# Patient Record
Sex: FEMALE | Race: WHITE | HISPANIC OR LATINO | Employment: UNEMPLOYED | URBAN - METROPOLITAN AREA
[De-identification: names, ages, dates, MRNs, and addresses within clinical notes are randomized per-mention and may not be internally consistent; named-entity substitution may affect disease eponyms.]

---

## 2022-04-27 ENCOUNTER — ANESTHESIA (OUTPATIENT)
Dept: OBSTETRICS AND GYNECOLOGY | Facility: HOSPITAL | Age: 32
End: 2022-04-27
Payer: MEDICAID

## 2022-04-27 ENCOUNTER — HOSPITAL ENCOUNTER (INPATIENT)
Facility: HOSPITAL | Age: 32
LOS: 3 days | Discharge: HOME | End: 2022-04-30
Attending: SPECIALIST | Admitting: SPECIALIST
Payer: MEDICAID

## 2022-04-27 ENCOUNTER — ANESTHESIA EVENT (OUTPATIENT)
Dept: OBSTETRICS AND GYNECOLOGY | Facility: HOSPITAL | Age: 32
End: 2022-04-27
Payer: MEDICAID

## 2022-04-27 DIAGNOSIS — Z98.891 H/O CESAREAN SECTION: ICD-10-CM

## 2022-04-27 LAB
ABO + RH BLD: NORMAL
AMPHET+METHAMPHET UR QL: NEGATIVE
BARBITURATES UR QL SCN>200 NG/ML: NEGATIVE
BASOPHILS # BLD AUTO: 0.02 K/UL (ref 0–0.2)
BASOPHILS # BLD AUTO: 0.04 K/UL (ref 0–0.2)
BASOPHILS NFR BLD: 0.1 % (ref 0–1.9)
BASOPHILS NFR BLD: 0.3 % (ref 0–1.9)
BENZODIAZ UR QL SCN>200 NG/ML: NEGATIVE
BLD GP AB SCN CELLS X3 SERPL QL: NORMAL
BUPRENORPHINE UR QL: NEGATIVE
BZE UR QL SCN: NEGATIVE
CANNABINOIDS UR QL SCN: NEGATIVE
CREAT UR-MCNC: 236 MG/DL (ref 15–325)
DIFFERENTIAL METHOD: ABNORMAL
DIFFERENTIAL METHOD: ABNORMAL
EOSINOPHIL # BLD AUTO: 0 K/UL (ref 0–0.5)
EOSINOPHIL # BLD AUTO: 0 K/UL (ref 0–0.5)
EOSINOPHIL NFR BLD: 0 % (ref 0–8)
EOSINOPHIL NFR BLD: 0.1 % (ref 0–8)
ERYTHROCYTE [DISTWIDTH] IN BLOOD BY AUTOMATED COUNT: 14 % (ref 11.5–14.5)
ERYTHROCYTE [DISTWIDTH] IN BLOOD BY AUTOMATED COUNT: 14 % (ref 11.5–14.5)
HCT VFR BLD AUTO: 34.7 % (ref 37–48.5)
HCT VFR BLD AUTO: 40.6 % (ref 37–48.5)
HGB BLD-MCNC: 11.3 G/DL (ref 12–16)
HGB BLD-MCNC: 13.7 G/DL (ref 12–16)
IMM GRANULOCYTES # BLD AUTO: 0.06 K/UL (ref 0–0.04)
IMM GRANULOCYTES # BLD AUTO: 0.08 K/UL (ref 0–0.04)
IMM GRANULOCYTES NFR BLD AUTO: 0.4 % (ref 0–0.5)
IMM GRANULOCYTES NFR BLD AUTO: 0.5 % (ref 0–0.5)
LYMPHOCYTES # BLD AUTO: 1.2 K/UL (ref 1–4.8)
LYMPHOCYTES # BLD AUTO: 1.4 K/UL (ref 1–4.8)
LYMPHOCYTES NFR BLD: 10 % (ref 18–48)
LYMPHOCYTES NFR BLD: 7.2 % (ref 18–48)
MCH RBC QN AUTO: 32 PG (ref 27–31)
MCH RBC QN AUTO: 32.6 PG (ref 27–31)
MCHC RBC AUTO-ENTMCNC: 32.6 G/DL (ref 32–36)
MCHC RBC AUTO-ENTMCNC: 33.7 G/DL (ref 32–36)
MCV RBC AUTO: 97 FL (ref 82–98)
MCV RBC AUTO: 98 FL (ref 82–98)
MONOCYTES # BLD AUTO: 0.9 K/UL (ref 0.3–1)
MONOCYTES # BLD AUTO: 1.2 K/UL (ref 0.3–1)
MONOCYTES NFR BLD: 6.4 % (ref 4–15)
MONOCYTES NFR BLD: 6.8 % (ref 4–15)
NEUTROPHILS # BLD AUTO: 11.4 K/UL (ref 1.8–7.7)
NEUTROPHILS # BLD AUTO: 14.5 K/UL (ref 1.8–7.7)
NEUTROPHILS NFR BLD: 82.8 % (ref 38–73)
NEUTROPHILS NFR BLD: 85.4 % (ref 38–73)
NRBC BLD-RTO: 0 /100 WBC
NRBC BLD-RTO: 0 /100 WBC
OPIATES UR QL SCN: NEGATIVE
PCP UR QL SCN>25 NG/ML: NEGATIVE
PLATELET # BLD AUTO: 143 K/UL (ref 150–450)
PLATELET # BLD AUTO: 158 K/UL (ref 150–450)
PMV BLD AUTO: 11.8 FL (ref 9.2–12.9)
PMV BLD AUTO: 12.7 FL (ref 9.2–12.9)
RBC # BLD AUTO: 3.53 M/UL (ref 4–5.4)
RBC # BLD AUTO: 4.2 M/UL (ref 4–5.4)
RPR SER QL: NORMAL
TOXICOLOGY INFORMATION: NORMAL
WBC # BLD AUTO: 13.74 K/UL (ref 3.9–12.7)
WBC # BLD AUTO: 17 K/UL (ref 3.9–12.7)

## 2022-04-27 PROCEDURE — 37000008 HC ANESTHESIA 1ST 15 MINUTES: Performed by: SPECIALIST

## 2022-04-27 PROCEDURE — 27000221 HC OXYGEN, UP TO 24 HOURS

## 2022-04-27 PROCEDURE — 63600175 PHARM REV CODE 636 W HCPCS: Performed by: ANESTHESIOLOGY

## 2022-04-27 PROCEDURE — 36000680 HC C/S TUBAL LIGATION LEVEL I

## 2022-04-27 PROCEDURE — 12000002 HC ACUTE/MED SURGE SEMI-PRIVATE ROOM

## 2022-04-27 PROCEDURE — 71000039 HC RECOVERY, EACH ADD'L HOUR: Performed by: SPECIALIST

## 2022-04-27 PROCEDURE — 86901 BLOOD TYPING SEROLOGIC RH(D): CPT | Performed by: SPECIALIST

## 2022-04-27 PROCEDURE — 37000009 HC ANESTHESIA EA ADD 15 MINS: Performed by: SPECIALIST

## 2022-04-27 PROCEDURE — 25000003 PHARM REV CODE 250: Performed by: ANESTHESIOLOGY

## 2022-04-27 PROCEDURE — 71000033 HC RECOVERY, INTIAL HOUR: Performed by: SPECIALIST

## 2022-04-27 PROCEDURE — 99900035 HC TECH TIME PER 15 MIN (STAT)

## 2022-04-27 PROCEDURE — 25000003 PHARM REV CODE 250: Performed by: OBSTETRICS & GYNECOLOGY

## 2022-04-27 PROCEDURE — 27000284 HC CANNULA NASAL: Performed by: ANESTHESIOLOGY

## 2022-04-27 PROCEDURE — 85025 COMPLETE CBC W/AUTO DIFF WBC: CPT | Performed by: SPECIALIST

## 2022-04-27 PROCEDURE — 27100025 HC TUBING, SET FLUID WARMER: Performed by: ANESTHESIOLOGY

## 2022-04-27 PROCEDURE — 36415 COLL VENOUS BLD VENIPUNCTURE: CPT | Performed by: SPECIALIST

## 2022-04-27 PROCEDURE — 63600175 PHARM REV CODE 636 W HCPCS: Performed by: NURSE ANESTHETIST, CERTIFIED REGISTERED

## 2022-04-27 PROCEDURE — 62326 NJX INTERLAMINAR LMBR/SAC: CPT | Performed by: ANESTHESIOLOGY

## 2022-04-27 PROCEDURE — 27000670 HC SET COMBINED SPINAL AND EPIDURAL: Performed by: ANESTHESIOLOGY

## 2022-04-27 PROCEDURE — 25000003 PHARM REV CODE 250: Performed by: SPECIALIST

## 2022-04-27 PROCEDURE — 80307 DRUG TEST PRSMV CHEM ANLYZR: CPT | Performed by: SPECIALIST

## 2022-04-27 PROCEDURE — 63600175 PHARM REV CODE 636 W HCPCS: Performed by: SPECIALIST

## 2022-04-27 PROCEDURE — 86592 SYPHILIS TEST NON-TREP QUAL: CPT | Performed by: SPECIALIST

## 2022-04-27 PROCEDURE — 94761 N-INVAS EAR/PLS OXIMETRY MLT: CPT

## 2022-04-27 PROCEDURE — 27000676 HC TUBING PRIMARY PLUMSET: Performed by: ANESTHESIOLOGY

## 2022-04-27 RX ORDER — DOCUSATE SODIUM 100 MG/1
200 CAPSULE, LIQUID FILLED ORAL 2 TIMES DAILY
Status: DISCONTINUED | OUTPATIENT
Start: 2022-04-27 | End: 2022-04-30 | Stop reason: HOSPADM

## 2022-04-27 RX ORDER — OXYCODONE HYDROCHLORIDE 5 MG/1
5 TABLET ORAL EVERY 4 HOURS PRN
Status: DISCONTINUED | OUTPATIENT
Start: 2022-04-27 | End: 2022-04-29

## 2022-04-27 RX ORDER — SODIUM CHLORIDE, SODIUM LACTATE, POTASSIUM CHLORIDE, CALCIUM CHLORIDE 600; 310; 30; 20 MG/100ML; MG/100ML; MG/100ML; MG/100ML
INJECTION, SOLUTION INTRAVENOUS CONTINUOUS PRN
Status: DISCONTINUED | OUTPATIENT
Start: 2022-04-27 | End: 2022-04-27

## 2022-04-27 RX ORDER — CEFOXITIN SODIUM 2 G/50ML
2 INJECTION, SOLUTION INTRAVENOUS ONCE
Status: COMPLETED | OUTPATIENT
Start: 2022-04-27 | End: 2022-04-27

## 2022-04-27 RX ORDER — DIPHENHYDRAMINE HYDROCHLORIDE 50 MG/ML
25 INJECTION INTRAMUSCULAR; INTRAVENOUS EVERY 4 HOURS PRN
Status: DISCONTINUED | OUTPATIENT
Start: 2022-04-27 | End: 2022-04-30 | Stop reason: HOSPADM

## 2022-04-27 RX ORDER — PROCHLORPERAZINE EDISYLATE 5 MG/ML
5 INJECTION INTRAMUSCULAR; INTRAVENOUS EVERY 6 HOURS PRN
Status: DISCONTINUED | OUTPATIENT
Start: 2022-04-27 | End: 2022-04-27 | Stop reason: SDUPTHER

## 2022-04-27 RX ORDER — ACETAMINOPHEN 325 MG/1
650 TABLET ORAL EVERY 6 HOURS
Status: COMPLETED | OUTPATIENT
Start: 2022-04-27 | End: 2022-04-28

## 2022-04-27 RX ORDER — ONDANSETRON 4 MG/1
8 TABLET, ORALLY DISINTEGRATING ORAL EVERY 8 HOURS PRN
Status: DISCONTINUED | OUTPATIENT
Start: 2022-04-27 | End: 2022-04-30 | Stop reason: HOSPADM

## 2022-04-27 RX ORDER — ADHESIVE BANDAGE
15 BANDAGE TOPICAL
Status: DISCONTINUED | OUTPATIENT
Start: 2022-04-27 | End: 2022-04-30 | Stop reason: HOSPADM

## 2022-04-27 RX ORDER — OXYCODONE HYDROCHLORIDE 5 MG/1
10 TABLET ORAL EVERY 4 HOURS PRN
Status: DISCONTINUED | OUTPATIENT
Start: 2022-04-27 | End: 2022-04-29

## 2022-04-27 RX ORDER — DIPHENHYDRAMINE HCL 25 MG
25 CAPSULE ORAL EVERY 4 HOURS PRN
Status: DISCONTINUED | OUTPATIENT
Start: 2022-04-27 | End: 2022-04-30 | Stop reason: HOSPADM

## 2022-04-27 RX ORDER — ONDANSETRON 2 MG/ML
INJECTION INTRAMUSCULAR; INTRAVENOUS
Status: DISCONTINUED | OUTPATIENT
Start: 2022-04-27 | End: 2022-04-27

## 2022-04-27 RX ORDER — MISOPROSTOL 200 UG/1
800 TABLET ORAL
Status: DISCONTINUED | OUTPATIENT
Start: 2022-04-27 | End: 2022-04-27

## 2022-04-27 RX ORDER — OXYTOCIN-SODIUM CHLORIDE 0.9% IV SOLN 30 UNIT/500ML 30-0.9/5 UT/ML-%
30 SOLUTION INTRAVENOUS ONCE
Status: DISCONTINUED | OUTPATIENT
Start: 2022-04-27 | End: 2022-04-30 | Stop reason: HOSPADM

## 2022-04-27 RX ORDER — CARBOPROST TROMETHAMINE 250 UG/ML
250 INJECTION, SOLUTION INTRAMUSCULAR
Status: DISCONTINUED | OUTPATIENT
Start: 2022-04-27 | End: 2022-04-27

## 2022-04-27 RX ORDER — OXYTOCIN-SODIUM CHLORIDE 0.9% IV SOLN 30 UNIT/500ML 30-0.9/5 UT/ML-%
SOLUTION INTRAVENOUS CONTINUOUS PRN
Status: DISCONTINUED | OUTPATIENT
Start: 2022-04-27 | End: 2022-04-27

## 2022-04-27 RX ORDER — NALOXONE HCL 0.4 MG/ML
0.02 VIAL (ML) INJECTION ONCE AS NEEDED
Status: DISCONTINUED | OUTPATIENT
Start: 2022-04-27 | End: 2022-04-30 | Stop reason: HOSPADM

## 2022-04-27 RX ORDER — METHYLERGONOVINE MALEATE 0.2 MG/ML
200 INJECTION INTRAVENOUS
Status: COMPLETED | OUTPATIENT
Start: 2022-04-27 | End: 2022-04-27

## 2022-04-27 RX ORDER — ONDANSETRON 2 MG/ML
4 INJECTION INTRAMUSCULAR; INTRAVENOUS EVERY 6 HOURS PRN
Status: DISCONTINUED | OUTPATIENT
Start: 2022-04-27 | End: 2022-04-30 | Stop reason: HOSPADM

## 2022-04-27 RX ORDER — SIMETHICONE 80 MG
1 TABLET,CHEWABLE ORAL EVERY 6 HOURS PRN
Status: DISCONTINUED | OUTPATIENT
Start: 2022-04-27 | End: 2022-04-30 | Stop reason: HOSPADM

## 2022-04-27 RX ORDER — PROCHLORPERAZINE EDISYLATE 5 MG/ML
5 INJECTION INTRAMUSCULAR; INTRAVENOUS EVERY 6 HOURS PRN
Status: DISCONTINUED | OUTPATIENT
Start: 2022-04-27 | End: 2022-04-30 | Stop reason: HOSPADM

## 2022-04-27 RX ORDER — DIPHENHYDRAMINE HYDROCHLORIDE 50 MG/ML
12.5 INJECTION INTRAMUSCULAR; INTRAVENOUS
Status: DISCONTINUED | OUTPATIENT
Start: 2022-04-27 | End: 2022-04-30 | Stop reason: HOSPADM

## 2022-04-27 RX ORDER — SODIUM CHLORIDE, SODIUM LACTATE, POTASSIUM CHLORIDE, CALCIUM CHLORIDE 600; 310; 30; 20 MG/100ML; MG/100ML; MG/100ML; MG/100ML
INJECTION, SOLUTION INTRAVENOUS CONTINUOUS
Status: DISCONTINUED | OUTPATIENT
Start: 2022-04-27 | End: 2022-04-27

## 2022-04-27 RX ORDER — BUPIVACAINE HYDROCHLORIDE 7.5 MG/ML
INJECTION, SOLUTION EPIDURAL; RETROBULBAR
Status: COMPLETED | OUTPATIENT
Start: 2022-04-27 | End: 2022-04-27

## 2022-04-27 RX ORDER — ACETAMINOPHEN 10 MG/ML
INJECTION, SOLUTION INTRAVENOUS
Status: DISCONTINUED | OUTPATIENT
Start: 2022-04-27 | End: 2022-04-27

## 2022-04-27 RX ORDER — MUPIROCIN 20 MG/G
OINTMENT TOPICAL
Status: DISCONTINUED | OUTPATIENT
Start: 2022-04-27 | End: 2022-04-30 | Stop reason: HOSPADM

## 2022-04-27 RX ORDER — FENTANYL CITRATE 50 UG/ML
INJECTION, SOLUTION INTRAMUSCULAR; INTRAVENOUS
Status: DISCONTINUED | OUTPATIENT
Start: 2022-04-27 | End: 2022-04-27

## 2022-04-27 RX ORDER — MISOPROSTOL 200 UG/1
400 TABLET ORAL ONCE
Status: COMPLETED | OUTPATIENT
Start: 2022-04-27 | End: 2022-04-27

## 2022-04-27 RX ORDER — HYDROMORPHONE HCL IN 0.9% NACL 6 MG/30 ML
PATIENT CONTROLLED ANALGESIA SYRINGE INTRAVENOUS CONTINUOUS
Status: DISCONTINUED | OUTPATIENT
Start: 2022-04-27 | End: 2022-04-27

## 2022-04-27 RX ORDER — NALBUPHINE HYDROCHLORIDE 20 MG/ML
5 INJECTION, SOLUTION INTRAMUSCULAR; INTRAVENOUS; SUBCUTANEOUS EVERY 4 HOURS PRN
Status: DISCONTINUED | OUTPATIENT
Start: 2022-04-27 | End: 2022-04-27

## 2022-04-27 RX ORDER — HYDROMORPHONE HYDROCHLORIDE 1 MG/ML
1 INJECTION, SOLUTION INTRAMUSCULAR; INTRAVENOUS; SUBCUTANEOUS EVERY 4 HOURS PRN
Status: DISCONTINUED | OUTPATIENT
Start: 2022-04-27 | End: 2022-04-27

## 2022-04-27 RX ADMIN — OXYCODONE HYDROCHLORIDE 10 MG: 5 TABLET ORAL at 09:04

## 2022-04-27 RX ADMIN — SODIUM CHLORIDE, SODIUM LACTATE, POTASSIUM CHLORIDE, AND CALCIUM CHLORIDE: .6; .31; .03; .02 INJECTION, SOLUTION INTRAVENOUS at 04:04

## 2022-04-27 RX ADMIN — ACETAMINOPHEN 650 MG: 325 TABLET, FILM COATED ORAL at 11:04

## 2022-04-27 RX ADMIN — ACETAMINOPHEN 1000 MG: 10 INJECTION, SOLUTION INTRAVENOUS at 04:04

## 2022-04-27 RX ADMIN — Medication 100 ML/HR: at 04:04

## 2022-04-27 RX ADMIN — ACETAMINOPHEN 650 MG: 325 TABLET, FILM COATED ORAL at 06:04

## 2022-04-27 RX ADMIN — ONDANSETRON 4 MG: 2 INJECTION INTRAMUSCULAR; INTRAVENOUS at 10:04

## 2022-04-27 RX ADMIN — Medication 2750 ML/HR: at 04:04

## 2022-04-27 RX ADMIN — BUPIVACAINE HYDROCHLORIDE 1.4 ML: 7.5 INJECTION, SOLUTION EPIDURAL; RETROBULBAR at 04:04

## 2022-04-27 RX ADMIN — SODIUM CHLORIDE, SODIUM LACTATE, POTASSIUM CHLORIDE, AND CALCIUM CHLORIDE: .6; .31; .03; .02 INJECTION, SOLUTION INTRAVENOUS at 03:04

## 2022-04-27 RX ADMIN — OXYCODONE HYDROCHLORIDE 5 MG: 5 TABLET ORAL at 08:04

## 2022-04-27 RX ADMIN — CEFOXITIN SODIUM 2 G: 2 INJECTION, SOLUTION INTRAVENOUS at 04:04

## 2022-04-27 RX ADMIN — Medication: at 07:04

## 2022-04-27 RX ADMIN — FENTANYL CITRATE 100 MCG: 50 INJECTION INTRAMUSCULAR; INTRAVENOUS at 04:04

## 2022-04-27 RX ADMIN — MISOPROSTOL 400 MCG: 200 TABLET ORAL at 08:04

## 2022-04-27 RX ADMIN — METHYLERGONOVINE MALEATE 200 MCG: 0.2 INJECTION, SOLUTION INTRAMUSCULAR; INTRAVENOUS at 04:04

## 2022-04-27 RX ADMIN — DOCUSATE SODIUM 200 MG: 100 CAPSULE, LIQUID FILLED ORAL at 09:04

## 2022-04-27 RX ADMIN — ONDANSETRON 4 MG: 2 INJECTION INTRAMUSCULAR; INTRAVENOUS at 03:04

## 2022-04-27 NOTE — ANESTHESIA PREPROCEDURE EVALUATION
04/27/2022  Tiffany Harry is a 31 y.o., female.      Pre-op Assessment    I have reviewed the Patient Summary Reports.     I have reviewed the Nursing Notes. I have reviewed the NPO Status.   I have reviewed the Medications.     Review of Systems  Anesthesia Hx:  No problems with previous Anesthesia Denies Hx of Anesthetic complications  Neg history of prior surgery. Denies Family Hx of Anesthesia complications.   Denies Personal Hx of Anesthesia complications.   Social:  Non-Smoker, No Alcohol Use    Hematology/Oncology:  Hematology Normal   Oncology Normal     EENT/Dental:EENT/Dental Normal   Cardiovascular:  Cardiovascular Normal     Pulmonary:  Pulmonary Normal    Renal/:  Renal/ Normal     Hepatic/GI:  Hepatic/GI Normal    Musculoskeletal:  Musculoskeletal Normal    Neurological:  Neurology Normal    Endocrine:  Endocrine Normal    Dermatological:  Skin Normal    Psych:  Psychiatric Normal           Physical Exam  General: Well nourished and Alert    Airway:  Mallampati: II   Mouth Opening: Normal  TM Distance: Normal  Tongue: Normal  Neck ROM: Normal ROM    Dental:  Intact    Chest/Lungs:  Clear to auscultation, Normal Respiratory Rate    Heart:  Rate: Normal  Rhythm: Regular Rhythm  Sounds: Normal        Anesthesia Plan  Type of Anesthesia, risks & benefits discussed:    Anesthesia Type: CSE  Intra-op Monitoring Plan: Standard ASA Monitors  Post Op Pain Control Plan: multimodal analgesia  Informed Consent: Informed consent signed with the Patient and all parties understand the risks and agree with anesthesia plan.  All questions answered. Patient consented to blood products? Yes  ASA Score: 2  Anesthesia Plan Notes: Tylenol 1 gm IV      Ready For Surgery From Anesthesia Perspective.     .

## 2022-04-27 NOTE — PROGRESS NOTES
Pt seen and evaluated.   in room and helped with communication.  Pt reports full return of sensation and motor function to the lower extremities.  She denies any pain at this time.  Patient was able to move both legs and feet examination.  No apparent anesthetic issues at this time.

## 2022-04-27 NOTE — ANESTHESIA PROCEDURE NOTES
CSE    Patient location during procedure: OB  Start time: 4/27/2022 4:04 AM  Timeout: 4/27/2022 4:03 AM  End time: 4/27/2022 4:17 AM    Reason for block: at surgeon's request and post-op pain management    Staffing  Authorizing Provider: Cholo Gimenez Jr., MD  Performing Provider: Cholo Gimenez Jr., MD    Preanesthetic Checklist  Completed: patient identified, IV checked, risks and benefits discussed, surgical consent, monitors and equipment checked, pre-op evaluation and timeout performed  CSE  Patient position: sitting  Prep: Betadine  Patient monitoring: heart rate, cardiac monitor, continuous pulse ox and frequent blood pressure checks  Approach: midline  Spinal Needle  Needle type: pencil-tip   Needle gauge: 25 G  Needle length: 5 in  Epidural Needle  Injection technique: LUCERO air  Needle type: Tuohy   Needle gauge: 17 G  Needle length: 3.5 in  Location: L3-4  Needle localization: anatomical landmarks   Assessment  Sensory level: T5   Dermatomal levels determined by pinch or prick  Intrathecal Medications:   administered: primary anesthetic mcg of    Additional Notes  Attempted @ L2 - L3 with good LUCERO. Epidural vein encountered with Tuohy needle, withdrawn and procedure done at L3 - L4. Spinal needle advanced and good CSF flow. 1.4 cc of spinal Marcaine .75% injected. Attempted to thread cath but would not thread. Tuohy removed, Pt placed supine and in MALA. Pt with good spinal block.  Medications:    Medications: Intraspinal - bupivacaine (pf) (MARCAINE) injection 0.75% - Intraspinal   1.4 mL - 4/27/2022 4:15:00 AM

## 2022-04-27 NOTE — PLAN OF CARE
Problem: Adult Inpatient Plan of Care  Goal: Plan of Care Review  Outcome: Ongoing, Progressing  Goal: Patient-Specific Goal (Individualized)  Outcome: Ongoing, Progressing  Goal: Optimal Comfort and Wellbeing  Outcome: Ongoing, Progressing     Problem: Infection  Goal: Absence of Infection Signs and Symptoms  Outcome: Ongoing, Progressing     Problem: Change in Fetal Wellbeing ( Delivery)  Goal: Stable Fetal Wellbeing  Outcome: Ongoing, Progressing     Problem: Infection ( Delivery)  Goal: Absence of Infection Signs and Symptoms  Outcome: Ongoing, Progressing

## 2022-04-27 NOTE — PROGRESS NOTES
OB Screen completed and no needs identified at this time.  White board in room updated with contact information, and mother was encouraged to contact office if further needs arise.     22 1412   OB SCREEN   Assessment Type Discharge Planning Assessment   Source of Information patient   Received Prenatal Care Yes   Any indications/suspicions for None   Is this a teen pregnancy No   Is the baby in NICU No   Indication for adoption/Safe Haven No   Indication for DME/post-acute needs No   HIV (+) No   Any congenital  disorders No   Fetal demise/ death No

## 2022-04-27 NOTE — TRANSFER OF CARE
"Anesthesia Transfer of Care Note    Patient: Tiffany Harry    Procedure(s) Performed: Procedure(s) (LRB):  REPEAT  SECTION, WITH TUBAL LIGATION (N/A)    Patient location: Labor and Delivery    Anesthesia Type: spinal    Transport from OR: Transported from OR on room air with adequate spontaneous ventilation    Post pain: adequate analgesia    Post assessment: no apparent anesthetic complications    Post vital signs: stable    Level of consciousness: awake, alert and oriented    Nausea/Vomiting: no nausea/vomiting    Complications: none    Transfer of care protocol was followed      Last vitals:   Visit Vitals  /72 (BP Location: Left arm, Patient Position: Lying)   Pulse 78   Temp 35.9 °C (96.6 °F) (Tympanic)   Resp 18   Ht 5' 3" (1.6 m)   Wt 78.5 kg (173 lb)   SpO2 100%   Breastfeeding No   BMI 30.65 kg/m²     "

## 2022-04-27 NOTE — CARE UPDATE
04/27/22 1508   PRE-TX-O2   O2 Device (Oxygen Therapy) nasal cannula   $ Is the patient on Low Flow Oxygen? Yes   Flow (L/min) 2   SpO2 98 %   Pulse Oximetry Type Continuous   $ Pulse Oximetry - Multiple Charge Pulse Oximetry - Multiple   Pulse 81   Resp 15   ETCO2   $ ETCO2 Usage Currently wearing   ETCO2 (mmHg) 34 mmHg   ETCO2 Device Type Portable Bedside Monitor   Respiratory Evaluation   $ Care Plan Tech Time 15 min

## 2022-04-28 PROCEDURE — 25000003 PHARM REV CODE 250: Performed by: SPECIALIST

## 2022-04-28 PROCEDURE — 25000003 PHARM REV CODE 250: Performed by: ANESTHESIOLOGY

## 2022-04-28 PROCEDURE — 12000002 HC ACUTE/MED SURGE SEMI-PRIVATE ROOM

## 2022-04-28 RX ADMIN — IBUPROFEN 600 MG: 400 TABLET ORAL at 09:04

## 2022-04-28 RX ADMIN — OXYCODONE HYDROCHLORIDE 10 MG: 5 TABLET ORAL at 07:04

## 2022-04-28 RX ADMIN — OXYCODONE HYDROCHLORIDE 10 MG: 5 TABLET ORAL at 03:04

## 2022-04-28 RX ADMIN — OXYCODONE HYDROCHLORIDE 10 MG: 5 TABLET ORAL at 11:04

## 2022-04-28 RX ADMIN — IBUPROFEN 600 MG: 400 TABLET ORAL at 03:04

## 2022-04-28 RX ADMIN — ACETAMINOPHEN 650 MG: 325 TABLET, FILM COATED ORAL at 11:04

## 2022-04-28 RX ADMIN — DOCUSATE SODIUM 200 MG: 100 CAPSULE, LIQUID FILLED ORAL at 08:04

## 2022-04-28 RX ADMIN — DOCUSATE SODIUM 200 MG: 100 CAPSULE, LIQUID FILLED ORAL at 09:04

## 2022-04-28 RX ADMIN — ACETAMINOPHEN 650 MG: 325 TABLET, FILM COATED ORAL at 05:04

## 2022-04-28 NOTE — PLAN OF CARE
Patient remained stable this shift, VSS, fundus firm and midline, lochia small, + flatus. Pain managed with meds per MAR, PCA discontinued. Dressing C/D/I. Baugh removed, voiding without difficulty. POC discussed, all questions answered, denies needs. Will continue to monitor and report to oncoming shift.

## 2022-04-28 NOTE — ANESTHESIA POSTPROCEDURE EVALUATION
Anesthesia Post Evaluation    Patient: Tiffany Harry    Procedure(s) Performed: Procedure(s) (LRB):  REPEAT  SECTION, WITH TUBAL LIGATION (N/A)    Final Anesthesia Type: spinal      Patient location during evaluation: labor & delivery  Patient participation: Yes- Able to Participate  Level of consciousness: awake and alert  Post-procedure vital signs: reviewed and stable  Pain management: adequate  Airway patency: patent    PONV status at discharge: nausea (controlled) and vomiting (controlled)  Anesthetic complications: no      Cardiovascular status: hemodynamically stable  Respiratory status: unassisted  Hydration status: euvolemic  Follow-up not needed.          Vitals Value Taken Time   /72 22   Temp 36.9 °C (98.5 °F) 22   Pulse 79 22   Resp 18 22 0750   SpO2 97 % 22         Event Time   Out of Recovery 09:25:00         Pain/Emily Score: Pain Rating Prior to Med Admin: 6 (2022  9:37 AM)  Pain Rating Post Med Admin: 2 (2022  8:50 AM)  Emliy Score: 9 (2022  7:10 AM)

## 2022-04-29 PROBLEM — O34.219 PREVIOUS CESAREAN DELIVERY AFFECTING PREGNANCY: Status: ACTIVE | Noted: 2022-04-29

## 2022-04-29 PROCEDURE — S5010 5% DEXTROSE AND 0.45% SALINE: HCPCS | Performed by: SPECIALIST

## 2022-04-29 PROCEDURE — 63600175 PHARM REV CODE 636 W HCPCS: Performed by: SPECIALIST

## 2022-04-29 PROCEDURE — 25000003 PHARM REV CODE 250: Performed by: SPECIALIST

## 2022-04-29 PROCEDURE — 25000003 PHARM REV CODE 250: Performed by: ANESTHESIOLOGY

## 2022-04-29 PROCEDURE — 12000002 HC ACUTE/MED SURGE SEMI-PRIVATE ROOM

## 2022-04-29 RX ORDER — POLYETHYLENE GLYCOL 3350 17 G/17G
17 POWDER, FOR SOLUTION ORAL 2 TIMES DAILY
Status: DISCONTINUED | OUTPATIENT
Start: 2022-04-29 | End: 2022-04-30 | Stop reason: HOSPADM

## 2022-04-29 RX ORDER — METOCLOPRAMIDE HYDROCHLORIDE 5 MG/ML
10 INJECTION INTRAMUSCULAR; INTRAVENOUS EVERY 6 HOURS
Status: COMPLETED | OUTPATIENT
Start: 2022-04-29 | End: 2022-04-30

## 2022-04-29 RX ORDER — DEXTROSE MONOHYDRATE AND SODIUM CHLORIDE 5; .45 G/100ML; G/100ML
INJECTION, SOLUTION INTRAVENOUS CONTINUOUS
Status: DISCONTINUED | OUTPATIENT
Start: 2022-04-29 | End: 2022-04-30 | Stop reason: HOSPADM

## 2022-04-29 RX ORDER — OXYCODONE AND ACETAMINOPHEN 10; 325 MG/1; MG/1
1 TABLET ORAL EVERY 4 HOURS PRN
Status: DISCONTINUED | OUTPATIENT
Start: 2022-04-29 | End: 2022-04-30 | Stop reason: HOSPADM

## 2022-04-29 RX ORDER — OXYCODONE AND ACETAMINOPHEN 5; 325 MG/1; MG/1
1 TABLET ORAL EVERY 4 HOURS PRN
Status: DISCONTINUED | OUTPATIENT
Start: 2022-04-29 | End: 2022-04-30 | Stop reason: HOSPADM

## 2022-04-29 RX ORDER — BISACODYL 10 MG
10 SUPPOSITORY, RECTAL RECTAL EVERY 6 HOURS PRN
Status: DISCONTINUED | OUTPATIENT
Start: 2022-04-29 | End: 2022-04-30 | Stop reason: HOSPADM

## 2022-04-29 RX ADMIN — SODIUM CHLORIDE 1000 ML: 0.9 INJECTION, SOLUTION INTRAVENOUS at 12:04

## 2022-04-29 RX ADMIN — DEXTROSE AND SODIUM CHLORIDE: 5; .45 INJECTION, SOLUTION INTRAVENOUS at 01:04

## 2022-04-29 RX ADMIN — DOCUSATE SODIUM 200 MG: 100 CAPSULE, LIQUID FILLED ORAL at 08:04

## 2022-04-29 RX ADMIN — SIMETHICONE 80 MG: 80 TABLET, CHEWABLE ORAL at 04:04

## 2022-04-29 RX ADMIN — IBUPROFEN 600 MG: 400 TABLET ORAL at 06:04

## 2022-04-29 RX ADMIN — DOCUSATE SODIUM 200 MG: 100 CAPSULE, LIQUID FILLED ORAL at 10:04

## 2022-04-29 RX ADMIN — OXYCODONE HYDROCHLORIDE AND ACETAMINOPHEN 1 TABLET: 10; 325 TABLET ORAL at 10:04

## 2022-04-29 RX ADMIN — OXYCODONE AND ACETAMINOPHEN 1 TABLET: 325; 5 TABLET ORAL at 04:04

## 2022-04-29 RX ADMIN — METOCLOPRAMIDE 10 MG: 5 INJECTION, SOLUTION INTRAMUSCULAR; INTRAVENOUS at 12:04

## 2022-04-29 RX ADMIN — SIMETHICONE 80 MG: 80 TABLET, CHEWABLE ORAL at 08:04

## 2022-04-29 RX ADMIN — METOCLOPRAMIDE 10 MG: 5 INJECTION, SOLUTION INTRAMUSCULAR; INTRAVENOUS at 06:04

## 2022-04-29 RX ADMIN — OXYCODONE HYDROCHLORIDE 10 MG: 5 TABLET ORAL at 06:04

## 2022-04-29 RX ADMIN — POLYETHYLENE GLYCOL 3350 17 G: 17 POWDER, FOR SOLUTION ORAL at 12:04

## 2022-04-29 RX ADMIN — POLYETHYLENE GLYCOL 3350 17 G: 17 POWDER, FOR SOLUTION ORAL at 10:04

## 2022-04-29 NOTE — NURSING
Juliette used ID [id number]. Shift assessment completed bleeding scant to light fundus firm without massage. Patient in some mild pain. position adjusted and pillow support applied. Patient educated on being up and walking around to help reduce gas and pain. Patient verbalized understanding. Patient stated with  arrives will walk in halls. No questions or concerns at this time. Patient instructed to call for any assistance. Patient verbalized understanding.

## 2022-04-29 NOTE — NURSING
Juliette used to with MD. ID [id number]. MD educated patient on absent bowel sounds and the importance of walking the halls. Explained that iv fluids will be started. Patient verbalized understanding. Patient up walking halls with  after MD stepped out.

## 2022-04-29 NOTE — PROGRESS NOTES
"      SUBJECTIVE:     Postpartum Day 1  Delivery    Tiffany Harry states she feels well and has no complaints. She denies emotional concerns. Her pain is well controlled with current medications. The patient is not ambulating. She is tolerating a regular diet. Flatus has not been passed. Urinary output is adequate.    OBJECTIVE:     Vital Signs (Most Recent):  /79 (BP Location: Left arm, Patient Position: Sitting)   Pulse 89   Temp 98.9 °F (37.2 °C) (Oral)   Resp 17   Ht 5' 3" (1.6 m)   Wt 78.5 kg (173 lb)   SpO2 96%   Breastfeeding No   BMI 30.65 kg/m²       Vital Signs Range (Last 24H):  Reviewed    I & O (Last 24H):  Intake/Output Summary (Last 24 hours)    Intake/Output Summary (Last 24 hours) at 2022 1129  Last data filed at 2022  Gross per 24 hour   Intake --   Output 1600 ml   Net -1600 ml         Physical Exam:  Gen appears in NAD  Abd soft,appropriate tenderness hypoactive bsIncision well approximated clean dry intact  Ext  neg homans, slight edema    Hemoglobin/Hematocrit  CBC:   Lab Results   Component Value Date/Time    WBC 17.00 (H) 2022 11:28 AM    RBC 3.53 (L) 2022 11:28 AM    HGB 11.3 (L) 2022 11:28 AM    HCT 34.7 (L) 2022 11:28 AM     (L) 2022 11:28 AM    MCV 98 2022 11:28 AM    MCH 32.0 (H) 2022 11:28 AM    MCHC 32.6 2022 11:28 AM       ABO/Rh  O POS    Rubella      ASSESSMENT/PLAN:     Status post  section.   Patient Active Problem List   Diagnosis    Previous  delivery affecting pregnancy        Doing well postoperatively.     Routine advances, Continue current care.  "

## 2022-04-29 NOTE — PLAN OF CARE
Patient remained stable this shift, VSS, fundus firm and midline, lochia small, + flatus. Pain managed with meds per MAR. POC discussed, all questions answered, denies needs. Will continue to monitor and report to oncoming shift.

## 2022-04-29 NOTE — PROGRESS NOTES
"      SUBJECTIVE:     Postpartum Day 2  Delivery    Tiffany Harry states she feels discomfort in her abdomen. She denies emotional concerns. Her pain is well controlled with current medications. The patient is not ambulating much is still in the bed. She is tolerating a regular diet. Flatus has still not been passed. Urinary output is adequate.    OBJECTIVE:     Vital Signs (Most Recent):  /79 (BP Location: Left arm, Patient Position: Sitting)   Pulse 89   Temp 98.9 °F (37.2 °C) (Oral)   Resp 17   Ht 5' 3" (1.6 m)   Wt 78.5 kg (173 lb)   SpO2 96%   Breastfeeding No   BMI 30.65 kg/m²       Vital Signs Range (Last 24H):  Reviewed    I & O (Last 24H):  Intake/Output Summary (Last 24 hours)    Intake/Output Summary (Last 24 hours) at 2022 1130  Last data filed at 2022  Gross per 24 hour   Intake --   Output 1600 ml   Net -1600 ml         Physical Exam:  Gen appears in NAD  Abd soft,appropriate tenderness absent bowel sounds   Incision well approximated clean dry intact  Ext  neg homans, slight edema    Hemoglobin/Hematocrit  CBC:   Lab Results   Component Value Date/Time    WBC 17.00 (H) 2022 11:28 AM    RBC 3.53 (L) 2022 11:28 AM    HGB 11.3 (L) 2022 11:28 AM    HCT 34.7 (L) 2022 11:28 AM     (L) 2022 11:28 AM    MCV 98 2022 11:28 AM    MCH 32.0 (H) 2022 11:28 AM    MCHC 32.6 2022 11:28 AM       ABO/Rh  O POS    Rubella      ASSESSMENT/PLAN:     Status post  section.   Patient Active Problem List   Diagnosis    Previous  delivery affecting pregnancy        Status post  postop day 2 with potential mild ileus developing  Will continue encourage ambulation out of bed, will restart IV and had IV Reglan IV fluids MiraLax possible suppository as needed  "

## 2022-04-30 VITALS
TEMPERATURE: 98 F | WEIGHT: 173 LBS | DIASTOLIC BLOOD PRESSURE: 70 MMHG | SYSTOLIC BLOOD PRESSURE: 123 MMHG | HEIGHT: 63 IN | RESPIRATION RATE: 18 BRPM | HEART RATE: 74 BPM | OXYGEN SATURATION: 98 % | BODY MASS INDEX: 30.65 KG/M2

## 2022-04-30 PROCEDURE — 25000003 PHARM REV CODE 250: Performed by: SPECIALIST

## 2022-04-30 PROCEDURE — 63600175 PHARM REV CODE 636 W HCPCS: Performed by: SPECIALIST

## 2022-04-30 RX ORDER — OXYCODONE AND ACETAMINOPHEN 5; 325 MG/1; MG/1
1 TABLET ORAL EVERY 4 HOURS PRN
Qty: 28 TABLET | Refills: 0 | Status: SHIPPED | OUTPATIENT
Start: 2022-04-30

## 2022-04-30 RX ORDER — DOCUSATE SODIUM 100 MG/1
100 CAPSULE, LIQUID FILLED ORAL 2 TIMES DAILY
Qty: 60 CAPSULE | Refills: 0 | Status: SHIPPED | OUTPATIENT
Start: 2022-04-30

## 2022-04-30 RX ORDER — IBUPROFEN 600 MG/1
600 TABLET ORAL EVERY 6 HOURS PRN
Qty: 30 TABLET | Refills: 0 | Status: SHIPPED | OUTPATIENT
Start: 2022-04-30

## 2022-04-30 RX ADMIN — POLYETHYLENE GLYCOL 3350 17 G: 17 POWDER, FOR SOLUTION ORAL at 09:04

## 2022-04-30 RX ADMIN — DOCUSATE SODIUM 200 MG: 100 CAPSULE, LIQUID FILLED ORAL at 09:04

## 2022-04-30 RX ADMIN — OXYCODONE HYDROCHLORIDE AND ACETAMINOPHEN 1 TABLET: 10; 325 TABLET ORAL at 04:04

## 2022-04-30 RX ADMIN — METOCLOPRAMIDE 10 MG: 5 INJECTION, SOLUTION INTRAMUSCULAR; INTRAVENOUS at 12:04

## 2022-04-30 RX ADMIN — SIMETHICONE 80 MG: 80 TABLET, CHEWABLE ORAL at 04:04

## 2022-04-30 RX ADMIN — IBUPROFEN 600 MG: 400 TABLET ORAL at 04:04

## 2022-04-30 RX ADMIN — OXYCODONE AND ACETAMINOPHEN 1 TABLET: 325; 5 TABLET ORAL at 09:04

## 2022-04-30 RX ADMIN — METOCLOPRAMIDE 10 MG: 5 INJECTION, SOLUTION INTRAMUSCULAR; INTRAVENOUS at 05:04

## 2022-04-30 NOTE — DISCHARGE INSTRUCTIONS
Pelvic rest for 6 weeks (no sex, tampons, douching, nothing in the vagina)    You can experience vaginal bleeding on and off for up to 6 weeks, it will gradually get lighter and the color will change from bright red to a brownish discharge towards the end.    Activity:  NO strenuous activities or exercising for 6 weeks.  Do not /lift anything over 15 pounds and no heavy housework or cleaning for 6 weeks.  Limit stair climbing to twice a day during the first 2 weeks.   NO driving for 4 weeks.  You may take short car trips but do not drive.    You may shower ONLY for the first 2 weeks, after 2 weeks you can soak in a bathtub.  Use a mild soap, no heavy perfumes or fragrances to avoid irritation.     Walking frequently following a  delivery promotes healing and decreases pain associated with gas.   If constipation develops:  You may take Colace (stool softener), Milk of Magnesia, Dulcolax or Miralax.  All of these medications are sold over the counter.      Incision Care:   Clean your incision with mild soap & warm water only- do not scrub- let warm water run over it, then pat dry.      Pain Relief:  You may take Motrin for mild pain & uterine cramping.      Emotional Changes:  You may experience baby blues after delivery.  You may feel let down, anxious and cry easily.  This is normal.  These feelings can begin 2-3 days after delivery and usually disappear in about a week or two.  Prolonged sadness may indicate postpartum depression.      Call your doctor for any of the following:  Difficulty breathing, problems with any of your medications, inability to eat.    Foul smelling vaginal discharge.  If you notice pus-like drainage from your incision, if your incision or the area around it becomes hot or swollen, or if you notice a foul smelling odor.  Temperature above 100.4.    Heavy vaginal bleeding.  All women bleed different after delivery and each delivery is different.  Heavy bleeding consists of  saturating a shaji pad in a 1 hour time period.  Passing clots are normal, if you pass a blood clot larger than the size of a golf ball call your doctor's office.   If you experience pain in your legs/calves, if one leg increases in size and becomes swollen or becomes hot to touch or discolored.   Crying or periods of sadness beyond 2 weeks.        If you are not breastfeeding:  Wear a tight bra and do not stimulate your breasts.  Avoid handling your breasts and do not express milk.  You may apply ice packs or cabbage leaves to relieve discomfort from engorgement.  If your breasts become warm to touch, reddened or lumps develop call your doctor.       Hacer un seguimiento con alvarez médico en 2 semanas o antes para cualquier problema.    Willard pélvico luba 6 semanas (sin sexo, tampones, douching, nada en la vagina)   Usted puede experimentar sangrado vaginal dentro y fuera de hasta 6 semanas, poco a poco se volverá más andrew y el color cambiará de garcia brillante a iqra secreción marrón hacia el final.  Actividad:   NO hay actividades extenuantes o hacer ejercicio luba 6 semanas. No recoja/levante nada de más de 15 libras y no haya tareas domésticas pesadas o limpieza luba 6 semanas. Limite la escalada de escaleras a dos veces al día luba las primeras 2 semanas.   NO conducir luba 4 semanas. Puede hacer viajes cortos en coche, caio no conducir.   Usted puede ducharse SOLAMENTE luba las primeras 2 semanas, después de 2 semanas puede empaparse en iqra bañera. Use un jabón suave, sin perfumes pesados ni fragancias para evitar la irritación.   Caminar con frecuencia después de un parto por cesárea promueve la curación y disminuye el dolor asociado con el gas.   Si se desarrolla estreñimiento: Puede meagan Colace (ablandador de heces), Leche de Magnesia, Dulcolax o Miralax. Todos estos medicamentos se venden sin receta.   Cuidado de la incisión:]  Lynn le mostró la enfermera de cuidado de heridas, quítese el  "apósito y retire el embalaje. Entonces usted puede ducharse y alvarez otro significativo puede empacar y reparar la herida. hacer esto diariamente hasta que el Dr. Fallon le indique lo contrario.    Alivio del dolor:   Puede meagan Motrin para el dolor leve y los calambres uterinos.      Cambios emocionales:   Usted puede experimentar "baby blues" después del parto. Usted puede sentirse defraudado, ansioso y llorar fácilmente. [place] es normal. Estos sentimientos pueden comenzar 2-3 días después del parto y por lo general desaparecen en aproximadamente iqra o dos semanas. La tristeza prolongada puede indicar depresión posparto.      Llame a alvarez médico para cualquiera de los siguientes:   Dificultad para respirar, problemas con cualquiera de kenna medicamentos, incapacidad para comer.   Secreción vaginal con olor fétido.   Si notas un drenaje similar al pus de la incisión, si la incisión o el área alrededor de fernanda se calienta o se hincha, o si notas un olor fétido que huele mal.   Temperatura por encima de 100.4.   Sangrado vaginal intenso. Todas las mujeres sangran diferentes después del parto y cada parto es diferente. El sangrado intenso consiste en saturar iqra almohadilla shaji en un período de tiempo de 1 hora. Los coágulos que pasan son normales, si pasas un coágulo de sussy mayor que el tamaño de iqra pelota de golf, llama al consultorio de tu médico.   Si experimenta dolor en las piernas/terneros, si iqra pierna aumenta de tamaño y se hincha o se calienta al tacto o se decolora.   Llorar o períodos de tristeza más allá de 2 semanas.       Si no está amamantando:   Use un sostén apretado y no estimule kenna senos. Evite manipular kenna senos y no exprese leche. Usted puede aplicar compresas de hielo o hojas de repollo para aliviar las molestias del engorgement. Si los senos se calientan al tacto, se enredan o se desarrollan bultos, llame al médico.   "

## 2022-04-30 NOTE — PLAN OF CARE
Thu used for communication. Minimal lochia. Ambulating and voiding without difficulty. Passing Flatus.Pain controlled by po medication.

## 2022-04-30 NOTE — PLAN OF CARE
VSS. Fundus firm, 1 below umbilicus with scant lochia. Staples removed from LT incision, edges approximated, clean and dry. Pain controlled with pain medicines as needed. Patient has audible bowel sounds and passing gas. Patient has been ambulating in hallway and room. Patient is voiding and tolerating a regular diet. Patient is bonding with baby.

## 2022-04-30 NOTE — DISCHARGE SUMMARY
[facility]  Obstetrics  Discharge Summary      Patient Name: Tiffany Harry  MRN: [id number]  Admission Date: 2022  Hospital Length of Stay: 3 days  Discharge Date and Time:  2022 11:46 AM  Attending Physician: No att. providers found   Discharging Provider: Morena Vazquez MD, MD   Primary Care Provider: Zenon Christianson MD    Procedure(s) (LRB):  REPEAT  SECTION, WITH TUBAL LIGATION (N/A)     Hospital Course:   30yo  @ 39w0d presented for repeat CD and BTL. Underwent uncomplicated procedure and on POD3 was meeting all discharge goals. Pain well controlled, tolerating regular diet, ambulating and voiding without difficulty, passing flatus, no heavy bleeding. VSS and exam reassuring with fundus firm, appropriate abdominal tenderness, bandage c/d/i, appropriate lochia, no evidence of DVT. Precautions reviewed and will f/u with Dr. Christianson.           Final Active Diagnoses:    Diagnosis Date Noted POA    PRINCIPAL PROBLEM:  Delivery of pregnancy by  section [O82] 2022 Unknown    Previous  delivery affecting pregnancy [O34.219] 2022 Yes      Problems Resolved During this Admission:        Significant Diagnostic Studies:   Lab Results   Component Value Date/Time     WBC 17.00 (H) 2022 11:28 AM     RBC 3.53 (L) 2022 11:28 AM     HGB 11.3 (L) 2022 11:28 AM     HCT 34.7 (L) 2022 11:28 AM      (L) 2022 11:28 AM     MCV 98 2022 11:28 AM     MCH 32.0 (H) 2022 11:28 AM     MCHC 32.6 2022 11:28 AM             Lab Results   Component Value Date    GROUPTRH O POS 2022         Feeding Method: bottle    Immunizations     Date Immunization Status Dose Route/Site Given by    22 MMR Incomplete 0.5 mL Subcutaneous/     22 Tdap Incomplete 0.5 mL Intramuscular/           Delivery:    Episiotomy:     Lacerations:     Repair suture:     Repair # of packets:     Blood loss (ml):        Birth information:  YOB: 2024   Time of birth: 4:38 AM   Sex: male   Delivery type: , Low Transverse   Gestational Age: 39w0d    Delivery Clinician:      Other providers:       Additional  information:  Forceps:    Vacuum:    Breech:    Observed anomalies      Living?:           APGARS  One minute Five minutes Ten minutes   Skin color:         Heart rate:         Grimace:         Muscle tone:         Breathing:         Totals: 7  9        Placenta: Delivered:       appearance    Pending Diagnostic Studies:     None          Discharged Condition: good    Disposition: Home or Self Care    Follow Up:   Follow-up Information     Zenon Christianson MD Follow up in 1 week(s).    Specialties: Obstetrics, Obstetrics and Gynecology  Why: For wound re-check, Postpartum check  Contact information:  [address]  [facility] [address]  [phone]                       Patient Instructions:      Diet Adult Regular     Lifting restrictions   Order Comments: No more than 10lbs     Pelvic Rest     Notify your health care provider if you experience any of the following:  temperature >100.4     Notify your health care provider if you experience any of the following:  persistent nausea and vomiting or diarrhea     Notify your health care provider if you experience any of the following:  severe uncontrolled pain     Notify your health care provider if you experience any of the following:  redness, tenderness, or signs of infection (pain, swelling, redness, odor or green/yellow discharge around incision site)     Notify your health care provider if you experience any of the following:  difficulty breathing or increased cough     Notify your health care provider if you experience any of the following:  severe persistent headache     Notify your health care provider if you experience any of the following:  worsening rash     Notify your health care provider if you experience  any of the following:  persistent dizziness, light-headedness, or visual disturbances     Notify your health care provider if you experience any of the following:  increased confusion or weakness     No dressing needed     Medications:  Discharge Medication List as of 4/30/2022 12:23 PM      START taking these medications    Details   docusate sodium (COLACE) 100 MG capsule Take 1 capsule (100 mg total) by mouth 2 (two) times daily., Starting Sat 4/30/2022, Print      ibuprofen (ADVIL,MOTRIN) 600 MG tablet Take 1 tablet (600 mg total) by mouth every 6 (six) hours as needed for Pain., Starting Sat 4/30/2022, Print      oxyCODONE-acetaminophen (PERCOCET) 5-325 mg per tablet Take 1 tablet by mouth every 4 (four) hours as needed for Pain., Starting Sat 4/30/2022, Print         CONTINUE these medications which have NOT CHANGED    Details   prenatal vit/iron fum/folic ac (PRENATAL 1+1 ORAL) Take 1 tablet by mouth., Historical Med             Morena Vazquez MD, MD  Obstetrics  [facility]

## 2022-05-15 NOTE — OP NOTE
Section Operative Note with bilateral distal salpingectomy    Indications: Previous  desires elective sterilization    Pre-operative Diagnosis: [id number]    Post-operative Diagnosis: same    Surgeon: WINIFRED MARTINEZ     Assistants: tech    Anesthesia:  Spinal    Procedure Details:     After appropriate informed consent was obtained, including the risk of surgery, the patient was taken back to the operating room and placed in the dorsal supine position with leftward tilt.  She was prepped abdominally and a Baugh catheter was used to drain the bladder.  She was then draped.  She was tested for adequate anesthesia, which was excellent.  Next, a Pfannenstiel incision was made which was taken down to the fascia.  The fascia was then nicked sharply and extended laterally with the Metzenbaum scissors.  The underlying muscles were then dissected off superiorly and inferiorly sharply by elevating the fascia with the Ochsner clamps.  Next, the peritoneum was entered in the midline bluntly and extended superiorly and inferiorly. The vesicouterine peritoneum was incised and a bladder flap created.  A low transverse hysterotomy was made and extended in a smiley face fashion bluntly.  Next, the baby was delivered without difficulty and handed off to the awaiting nurse practitioner.  Next, the uterus was massaged.  The placenta was delivered.  The uterus was exteriorized cleared of all clots and debris.  The uterus was then closed with a running locking 0 Maxon with excellent hemostasis. The distal ends of the both tubes were identified and were grasped with Jeanie clamps allowing excision of the distal tube including the fimbriated ends with Shaka scissors. The pedicles were suture ligated with a fore and aft stitch of 0 plain gut and ends briefly cauterized ensuring excellent hemostasis. Next, the cul-de-sac and the pericolic gutters were copiously irrigated and suctioned.  The uterus was replaced back into the  pelvic cavity and again hemostasis was checked which was excellent.  Next, the muscles were gently reapproximated with a 3-0 Monocryl in a figure-of-eight fashion.  The fascia was closed with two sutures of 0 Maxon in a running fashion, meeting in the middle.  The subcutaneous tissues were copiously irrigated and any bleeding points cauterized.  The skin was then closed with clips and a pressure dressing placed.     Instrument, sponge, and needle counts were correct prior the abdominal closure and at the conclusion of the case. She was sent to recovery room in stable condition.    Findings:  The uterus, tubes and ovaries appeared normal.  Apgar 8 9      Estimated Blood Loss:  450           Total IV Fluids: per anesthesia           Specimens:  Placenta and bilateral tubes           Complications:  None; patient tolerated the procedure well.           Disposition: recovery room           Condition: stable    Attending Attestation: I performed the procedure.

## (undated) DEVICE — HEMOSTAT FIBRILLAR 2X4 1962